# Patient Record
Sex: FEMALE | URBAN - METROPOLITAN AREA
[De-identification: names, ages, dates, MRNs, and addresses within clinical notes are randomized per-mention and may not be internally consistent; named-entity substitution may affect disease eponyms.]

---

## 2018-08-06 ENCOUNTER — APPOINTMENT (RX ONLY)
Dept: URBAN - METROPOLITAN AREA CLINIC 23 | Facility: CLINIC | Age: 53
Setting detail: DERMATOLOGY
End: 2018-08-06

## 2018-08-06 DIAGNOSIS — Z41.9 ENCOUNTER FOR PROCEDURE FOR PURPOSES OTHER THAN REMEDYING HEALTH STATE, UNSPECIFIED: ICD-10-CM

## 2018-08-06 PROCEDURE — ? VELASHAPE

## 2018-08-06 PROCEDURE — ? ULTRASHAPE

## 2018-08-06 NOTE — PROCEDURE: ULTRASHAPE
Anesthesia Type: 1% lidocaine with epinephrine
Post-Care Instructions: I reviewed with the patient in detail post-care instructions. Patient should stay away from the sun and wear sun protection until treated areas are fully healed.
Treatment Area: back/ bra area
Anesthesia Volume In Cc: 0
Pre-Procedure Text: The treatment areas were then cleaned and a coupling gel was applied.
Focal Treatment Zones (Optional): 7
Consent: Written consent obtained, risks reviewed including but not limited to crusting, scabbing, blistering, scarring, darker or lighter pigmentary change, and/or incomplete improvement.
Post-Procedures Photographs: No
Treatment Number (Optional): 1
Post-Procedure: The patient tolerated the procedure well and post-care was reviewed.
Focal Treatment Zones (Optional): 4
Detail Level: Zone
Pre-Procedures Photographs: Yes

## 2018-08-06 NOTE — PROCEDURE: VELASHAPE
Mode Used: [employer]ify
Consent: Written consent obtained, risks reviewed including but not limited to crusting, scabbing, blistering, scarring, darker or lighter pigmentary change, and/or incomplete improvement.
Post-Care Instructions: I reviewed with the patient in detail post-care instructions. Patient should stay away from the sun and wear sun protection until treated areas are fully healed.
Post-Procedures Photographs: No
Treatment Number (Optional): 1
Detail Level: Zone
Pre-Procedure Text: The treatment areas were then cleaned and a coupling gel was applied.
Post-Procedure: The patient tolerated the procedure well and post-care was reviewed.
Anesthesia Volume In Cc: 0
Anesthesia Type: 1% lidocaine with epinephrine
Pre-Procedures Photographs: Yes
Mode Used: Louie Mercury
Treatment Area: arms

## 2018-08-22 ENCOUNTER — APPOINTMENT (RX ONLY)
Dept: URBAN - METROPOLITAN AREA CLINIC 23 | Facility: CLINIC | Age: 53
Setting detail: DERMATOLOGY
End: 2018-08-22

## 2018-08-22 DIAGNOSIS — Z71.89 OTHER SPECIFIED COUNSELING: ICD-10-CM

## 2018-08-22 DIAGNOSIS — D18.0 HEMANGIOMA: ICD-10-CM

## 2018-08-22 DIAGNOSIS — Z41.9 ENCOUNTER FOR PROCEDURE FOR PURPOSES OTHER THAN REMEDYING HEALTH STATE, UNSPECIFIED: ICD-10-CM

## 2018-08-22 DIAGNOSIS — D22 MELANOCYTIC NEVI: ICD-10-CM

## 2018-08-22 DIAGNOSIS — L81.4 OTHER MELANIN HYPERPIGMENTATION: ICD-10-CM

## 2018-08-22 PROBLEM — D22.5 MELANOCYTIC NEVI OF TRUNK: Status: ACTIVE | Noted: 2018-08-22

## 2018-08-22 PROBLEM — E03.9 HYPOTHYROIDISM, UNSPECIFIED: Status: ACTIVE | Noted: 2018-08-22

## 2018-08-22 PROBLEM — D18.01 HEMANGIOMA OF SKIN AND SUBCUTANEOUS TISSUE: Status: ACTIVE | Noted: 2018-08-22

## 2018-08-22 PROCEDURE — ? VELASHAPE

## 2018-08-22 PROCEDURE — 99213 OFFICE O/P EST LOW 20 MIN: CPT

## 2018-08-22 PROCEDURE — ? SUNSCREEN RECOMMENDATIONS

## 2018-08-22 PROCEDURE — ? ULTRASHAPE

## 2018-08-22 PROCEDURE — ? COUNSELING

## 2018-08-22 PROCEDURE — ? RECOMMENDATIONS

## 2018-08-22 ASSESSMENT — LOCATION SIMPLE DESCRIPTION DERM
LOCATION SIMPLE: LOWER BACK
LOCATION SIMPLE: RIGHT UPPER ARM
LOCATION SIMPLE: LEFT CHEEK
LOCATION SIMPLE: CHEST
LOCATION SIMPLE: LEFT UPPER BACK
LOCATION SIMPLE: LEFT UPPER ARM

## 2018-08-22 ASSESSMENT — LOCATION ZONE DERM
LOCATION ZONE: TRUNK
LOCATION ZONE: ARM
LOCATION ZONE: FACE

## 2018-08-22 ASSESSMENT — SEVERITY ASSESSMENT: SEVERITY: MODERATE

## 2018-08-22 ASSESSMENT — LOCATION DETAILED DESCRIPTION DERM
LOCATION DETAILED: SUPERIOR LUMBAR SPINE
LOCATION DETAILED: LEFT MID-UPPER BACK
LOCATION DETAILED: LEFT ANTERIOR DISTAL UPPER ARM
LOCATION DETAILED: LEFT INFERIOR CENTRAL MALAR CHEEK
LOCATION DETAILED: STERNAL NOTCH
LOCATION DETAILED: RIGHT ANTERIOR PROXIMAL UPPER ARM

## 2018-08-22 NOTE — PROCEDURE: ULTRASHAPE
Treatment Area: upper back
Focal Treatment Zones (Optional): 2
Anesthesia Type: 1% lidocaine with epinephrine
Focal Treatment Zones (Optional): 4
Pre-Procedures Photographs: Yes
Use Distraction Techniques In Note: No
Detail Level: Zone
Post-Care Instructions: I reviewed with the patient in detail post-care instructions. Patient should stay away from the sun and wear sun protection until treated areas are fully healed.
Consent: Written consent obtained, risks reviewed including but not limited to crusting, scabbing, blistering, scarring, darker or lighter pigmentary change, and/or incomplete improvement.
Post-Procedure: The patient tolerated the procedure well and post-care was reviewed.
Anesthesia Volume In Cc: 0
Pre-Procedure Text: The treatment areas were then cleaned and a coupling gel was applied.

## 2018-08-22 NOTE — PROCEDURE: VELASHAPE
Post-Procedures Photographs: No
Detail Level: Zone
Treatment Number (Optional): 2
Anesthesia Volume In Cc: 0
Mode Used: [employer]
Pre-Procedures Photographs: Yes
Post-Care Instructions: I reviewed with the patient in detail post-care instructions. Patient should stay away from the sun and wear sun protection until treated areas are fully healed.
Treatment Area: [facility]
Post-Procedure: The patient tolerated the procedure well and post-care was reviewed.
Anesthesia Type: 1% lidocaine with epinephrine
Pre-Procedure Text: The treatment areas were then cleaned and a coupling gel was applied.
Mode Used: Alia Bolls
Consent: Written consent obtained, risks reviewed including but not limited to crusting, scabbing, blistering, scarring, darker or lighter pigmentary change, and/or incomplete improvement.

## 2018-08-22 NOTE — PROCEDURE: RECOMMENDATIONS
Recommendations (Free Text): V-Beam for the chest- Pt quoted $350 per treatment \\nV-Beam for veins on the face-Pt quoted $350 per treatment \\nBrightening ads with 6% HQ for the arms \\nRetinol Lite for the arms at night
Detail Level: Zone

## 2018-08-29 ENCOUNTER — APPOINTMENT (RX ONLY)
Dept: URBAN - METROPOLITAN AREA CLINIC 23 | Facility: CLINIC | Age: 53
Setting detail: DERMATOLOGY
End: 2018-08-29

## 2018-08-29 DIAGNOSIS — Z41.9 ENCOUNTER FOR PROCEDURE FOR PURPOSES OTHER THAN REMEDYING HEALTH STATE, UNSPECIFIED: ICD-10-CM

## 2018-08-29 PROCEDURE — ? FACIAL

## 2018-08-29 ASSESSMENT — LOCATION ZONE DERM: LOCATION ZONE: FACE

## 2018-08-29 ASSESSMENT — LOCATION DETAILED DESCRIPTION DERM: LOCATION DETAILED: LEFT SUPERIOR CENTRAL BUCCAL CHEEK

## 2018-08-29 ASSESSMENT — LOCATION SIMPLE DESCRIPTION DERM: LOCATION SIMPLE: LEFT CHEEK

## 2018-08-29 NOTE — PROCEDURE: FACIAL
Price (Use Numbers Only, No Special Characters Or $): 100
Detail Level: Zone
Treatment Type Override: facial
Extraction Method: extractor
Facial Steaming: steamed
Exfoliation Type: scrub
Treatment Type (Optional): European Facial

## 2018-09-05 ENCOUNTER — APPOINTMENT (RX ONLY)
Dept: URBAN - METROPOLITAN AREA CLINIC 23 | Facility: CLINIC | Age: 53
Setting detail: DERMATOLOGY
End: 2018-09-05

## 2018-09-05 DIAGNOSIS — Z41.9 ENCOUNTER FOR PROCEDURE FOR PURPOSES OTHER THAN REMEDYING HEALTH STATE, UNSPECIFIED: ICD-10-CM

## 2018-09-05 PROCEDURE — ? VELASHAPE

## 2018-09-05 PROCEDURE — ? ULTRASHAPE

## 2018-09-05 NOTE — PROCEDURE: VELASHAPE
Pre-Procedure Text: The treatment areas were then cleaned and a coupling gel was applied.
Anesthesia Type: 1% lidocaine with epinephrine
Anesthesia Volume In Cc: 0
Pre-Procedures Photographs: Yes
Post-Care Instructions: I reviewed with the patient in detail post-care instructions. Patient should stay away from the sun and wear sun protection until treated areas are fully healed.
Treatment Number (Optional): 3
Mode Used: Belkis Hernandez
Use Distraction Techniques In Note: No
Mode Used: [employer]
Detail Level: Zone
Post-Procedure: The patient tolerated the procedure well and post-care was reviewed.
Treatment Area: arms
Consent: Written consent obtained, risks reviewed including but not limited to crusting, scabbing, blistering, scarring, darker or lighter pigmentary change, and/or incomplete improvement.
Mode Used: Carmina Hays

## 2018-09-17 ENCOUNTER — APPOINTMENT (RX ONLY)
Dept: URBAN - METROPOLITAN AREA CLINIC 23 | Facility: CLINIC | Age: 53
Setting detail: DERMATOLOGY
End: 2018-09-17

## 2018-09-17 DIAGNOSIS — Z41.9 ENCOUNTER FOR PROCEDURE FOR PURPOSES OTHER THAN REMEDYING HEALTH STATE, UNSPECIFIED: ICD-10-CM

## 2018-09-17 PROCEDURE — ? ULTRASHAPE

## 2018-09-17 PROCEDURE — ? VELASHAPE

## 2018-09-17 NOTE — PROCEDURE: ULTRASHAPE
Post-Procedure: The patient tolerated the procedure well and post-care was reviewed.
Pre-Procedures Photographs: Yes
Post-Procedures Photographs: No
Consent: Written consent obtained, risks reviewed including but not limited to crusting, scabbing, blistering, scarring, darker or lighter pigmentary change, and/or incomplete improvement.
Focal Treatment Zones (Optional): 3
Focal Treatment Zones (Optional): 7
Anesthesia Type: 1% lidocaine with epinephrine
Anesthesia Volume In Cc: 0
Detail Level: Zone
Pre-Procedure Text: The treatment areas were then cleaned and a coupling gel was applied.
Treatment Area: abdomen
Post-Care Instructions: I reviewed with the patient in detail post-care instructions. Patient should stay away from the sun and wear sun protection until treated areas are fully healed.

## 2018-09-17 NOTE — PROCEDURE: VELASHAPE
Treatment Number (Optional): 4
Treatment Area: abdomen
Post-Care Instructions: I reviewed with the patient in detail post-care instructions. Patient should stay away from the sun and wear sun protection until treated areas are fully healed.
Detail Level: Zone
Post-Procedure: The patient tolerated the procedure well and post-care was reviewed.
Mode Used: [employer]
Anesthesia Type: 1% lidocaine with epinephrine
Consent: Written consent obtained, risks reviewed including but not limited to crusting, scabbing, blistering, scarring, darker or lighter pigmentary change, and/or incomplete improvement.
Mode Used: Luis Oscar
Treatment Area: arms
Treatment Number (Optional): 1
Use Distraction Techniques In Note: No
Pre-Procedures Photographs: Yes
Anesthesia Volume In Cc: 0
Pre-Procedure Text: The treatment areas were then cleaned and a coupling gel was applied.

## 2018-10-01 ENCOUNTER — APPOINTMENT (RX ONLY)
Dept: URBAN - METROPOLITAN AREA CLINIC 23 | Facility: CLINIC | Age: 53
Setting detail: DERMATOLOGY
End: 2018-10-01

## 2018-10-01 DIAGNOSIS — Z41.9 ENCOUNTER FOR PROCEDURE FOR PURPOSES OTHER THAN REMEDYING HEALTH STATE, UNSPECIFIED: ICD-10-CM

## 2018-10-01 PROCEDURE — ? ULTRASHAPE

## 2018-10-01 PROCEDURE — ? VELASHAPE

## 2018-10-01 NOTE — PROCEDURE: VELASHAPE
Mode Used: [employer]
Post-Procedures Photographs: No
Pre-Procedures Photographs: Yes
Consent: Written consent obtained, risks reviewed including but not limited to crusting, scabbing, blistering, scarring, darker or lighter pigmentary change, and/or incomplete improvement.
Pre-Procedure Text: The treatment areas were then cleaned and a coupling gel was applied.
Post-Procedure: The patient tolerated the procedure well and post-care was reviewed.
Treatment Area: abdomen
Anesthesia Type: 1% lidocaine with epinephrine
Detail Level: Zone
Anesthesia Volume In Cc: 0
Post-Care Instructions: I reviewed with the patient in detail post-care instructions. Patient should stay away from the sun and wear sun protection until treated areas are fully healed.
Treatment Number (Optional): 2

## 2018-10-01 NOTE — PROCEDURE: ULTRASHAPE
Anesthesia Volume In Cc: 0
Treatment Number (Optional): 2
Pre-Procedures Photographs: Yes
Post-Care Instructions: I reviewed with the patient in detail post-care instructions. Patient should stay away from the sun and wear sun protection until treated areas are fully healed.
Detail Level: Zone
Post-Procedures Photographs: No
Pre-Procedure Text: The treatment areas were then cleaned and a coupling gel was applied.
Focal Treatment Zones (Optional): 8
Anesthesia Type: 1% lidocaine with epinephrine
Consent: Written consent obtained, risks reviewed including but not limited to crusting, scabbing, blistering, scarring, darker or lighter pigmentary change, and/or incomplete improvement.
Focal Treatment Zones (Optional): 3
Post-Procedure: The patient tolerated the procedure well and post-care was reviewed.
Treatment Area: abdomen

## 2018-10-15 ENCOUNTER — APPOINTMENT (RX ONLY)
Dept: URBAN - METROPOLITAN AREA CLINIC 23 | Facility: CLINIC | Age: 53
Setting detail: DERMATOLOGY
End: 2018-10-15

## 2018-10-15 DIAGNOSIS — Z41.9 ENCOUNTER FOR PROCEDURE FOR PURPOSES OTHER THAN REMEDYING HEALTH STATE, UNSPECIFIED: ICD-10-CM

## 2018-10-15 PROCEDURE — ? VELASHAPE

## 2018-10-15 PROCEDURE — ? ULTRASHAPE

## 2018-10-15 NOTE — PROCEDURE: VELASHAPE
Anesthesia Volume In Cc: 0
Post-Procedure: The patient tolerated the procedure well and post-care was reviewed.
Treatment Number (Optional): 3
Mode Used: [employer]
Pre-Procedure Text: The treatment areas were then cleaned and a coupling gel was applied.
Detail Level: Zone
Treatment Area: abdomen
Consent: Written consent obtained, risks reviewed including but not limited to crusting, scabbing, blistering, scarring, darker or lighter pigmentary change, and/or incomplete improvement.
Pre-Procedures Photographs: Yes
Post-Procedures Photographs: No
Post-Care Instructions: I reviewed with the patient in detail post-care instructions. Patient should stay away from the sun and wear sun protection until treated areas are fully healed.
Anesthesia Type: 1% lidocaine with epinephrine

## 2018-10-15 NOTE — PROCEDURE: ULTRASHAPE
Use Distraction Techniques In Note: No
Pre-Procedures Photographs: Yes
Post-Procedure: The patient tolerated the procedure well and post-care was reviewed.
Anesthesia Type: 1% lidocaine with epinephrine
Post-Care Instructions: I reviewed with the patient in detail post-care instructions. Patient should stay away from the sun and wear sun protection until treated areas are fully healed.
Focal Treatment Zones (Optional): 8
Detail Level: Zone
Treatment Area: abdomen
Consent: Written consent obtained, risks reviewed including but not limited to crusting, scabbing, blistering, scarring, darker or lighter pigmentary change, and/or incomplete improvement.
Anesthesia Volume In Cc: 0
Pre-Procedure Text: The treatment areas were then cleaned and a coupling gel was applied.
Treatment Number (Optional): 3
Focal Treatment Zones (Optional): 6